# Patient Record
Sex: FEMALE | Race: WHITE | NOT HISPANIC OR LATINO
[De-identification: names, ages, dates, MRNs, and addresses within clinical notes are randomized per-mention and may not be internally consistent; named-entity substitution may affect disease eponyms.]

---

## 2023-02-02 ENCOUNTER — APPOINTMENT (OUTPATIENT)
Dept: ORTHOPEDIC SURGERY | Facility: CLINIC | Age: 58
End: 2023-02-02
Payer: OTHER MISCELLANEOUS

## 2023-02-02 VITALS — HEIGHT: 63 IN | WEIGHT: 170 LBS | BODY MASS INDEX: 30.12 KG/M2

## 2023-02-02 DIAGNOSIS — S93.491A SPRAIN OF OTHER LIGAMENT OF RIGHT ANKLE, INITIAL ENCOUNTER: ICD-10-CM

## 2023-02-02 DIAGNOSIS — Z78.9 OTHER SPECIFIED HEALTH STATUS: ICD-10-CM

## 2023-02-02 PROBLEM — Z00.00 ENCOUNTER FOR PREVENTIVE HEALTH EXAMINATION: Status: ACTIVE | Noted: 2023-02-02

## 2023-02-02 PROCEDURE — 73610 X-RAY EXAM OF ANKLE: CPT | Mod: ACP,RT

## 2023-02-02 PROCEDURE — 99203 OFFICE O/P NEW LOW 30 MIN: CPT | Mod: ACP

## 2023-02-02 PROCEDURE — 99072 ADDL SUPL MATRL&STAF TM PHE: CPT | Mod: ACP

## 2023-02-04 PROBLEM — S93.491A SPRAIN OF ANTERIOR TALOFIBULAR LIGAMENT OF RIGHT ANKLE, INITIAL ENCOUNTER: Status: ACTIVE | Noted: 2023-02-04

## 2023-02-04 NOTE — HISTORY OF PRESENT ILLNESS
[Sudden] : sudden [6] : 6 [5] : 5 [Dull/Aching] : dull/aching [Ice] : ice [Work related] : work related [Full time] : Work status: full time [de-identified] : 02/02/2023: 57 year old female here for eval of R ankle pain since earlier today. she twisted ankle and fell. using ice, heat, tylenol, crutches. \par no h/o ankle injury\par \par presents wb  [] : no [FreeTextEntry1] : R ankle [FreeTextEntry3] : 2/2/23 [de-identified] : communication

## 2023-02-04 NOTE — DISCUSSION/SUMMARY
[de-identified] : Reviewed Xrays, discussed diagnosis, answered questions\par Reassurance, Cryotherapy, activity mod, rest, nsaids\par \par Rec immobilization - pt states she has a short cam boot at home \par \par FU foot/ankle in 2 weeks or sooner if pain worsens\par \par Patient warned of specific risks of medication related to bleeding, GI issues, increase blood pressure, and cardiac risks in addition to additional risks. Patient advised to discuss with PMD if any presence of stated issues.

## 2023-02-04 NOTE — PHYSICAL EXAM
[Right] : right foot and ankle [2+] : dorsalis pedis pulse: 2+ [] : patient ambulates without assistive device [FreeTextEntry8] : ATFL ttp [FreeTextEntry9] : painful inversion

## 2023-02-21 ENCOUNTER — APPOINTMENT (OUTPATIENT)
Dept: ORTHOPEDIC SURGERY | Facility: CLINIC | Age: 58
End: 2023-02-21

## 2025-03-27 ENCOUNTER — APPOINTMENT (OUTPATIENT)
Dept: PAIN MANAGEMENT | Facility: CLINIC | Age: 60
End: 2025-03-27

## 2025-03-27 VITALS — BODY MASS INDEX: 29.41 KG/M2 | HEIGHT: 63 IN | WEIGHT: 166 LBS

## 2025-03-27 DIAGNOSIS — M79.18 MYALGIA, OTHER SITE: ICD-10-CM

## 2025-03-27 DIAGNOSIS — M54.14 RADICULOPATHY, THORACIC REGION: ICD-10-CM

## 2025-03-27 PROCEDURE — 99203 OFFICE O/P NEW LOW 30 MIN: CPT | Mod: 25

## 2025-03-27 PROCEDURE — J3490M: CUSTOM | Mod: JZ

## 2025-03-27 PROCEDURE — 96372 THER/PROPH/DIAG INJ SC/IM: CPT

## 2025-03-27 PROCEDURE — 20552 NJX 1/MLT TRIGGER POINT 1/2: CPT

## 2025-03-27 RX ORDER — TIZANIDINE 4 MG/1
4 TABLET ORAL
Qty: 60 | Refills: 0 | Status: ACTIVE | COMMUNITY
Start: 2025-03-27 | End: 1900-01-01